# Patient Record
Sex: FEMALE | Employment: UNEMPLOYED | URBAN - METROPOLITAN AREA
[De-identification: names, ages, dates, MRNs, and addresses within clinical notes are randomized per-mention and may not be internally consistent; named-entity substitution may affect disease eponyms.]

---

## 2019-05-21 ENCOUNTER — HOSPITAL ENCOUNTER (INPATIENT)
Age: 19
LOS: 3 days | Discharge: HOME | DRG: 885 | End: 2019-05-24
Attending: PSYCHIATRY & NEUROLOGY | Admitting: PSYCHIATRY & NEUROLOGY
Payer: SELF-PAY

## 2019-05-21 PROBLEM — S61.519A SELF-INFLICTED LACERATION OF WRIST, INITIAL ENCOUNTER (HCC): Status: ACTIVE | Noted: 2019-05-21
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
## 2019-05-21 PROBLEM — Z87.09 HISTORY OF ASTHMA: Chronic | Status: ACTIVE | Noted: 2019-05-21

## 2019-05-21 PROBLEM — F32.9 MAJOR DEPRESSION: Status: ACTIVE | Noted: 2019-05-21

## 2019-05-21 PROBLEM — Z86.59 HISTORY OF ADHD: Chronic | Status: ACTIVE | Noted: 2019-05-21

## 2019-05-21 PROCEDURE — 65220000003 HC RM SEMIPRIVATE PSYCH

## 2019-05-21 PROCEDURE — 74011250637 HC RX REV CODE- 250/637: Performed by: PSYCHIATRY & NEUROLOGY

## 2019-05-21 RX ORDER — ADHESIVE BANDAGE
30 BANDAGE TOPICAL DAILY PRN
Status: DISCONTINUED | OUTPATIENT
Start: 2019-05-21 | End: 2019-05-24 | Stop reason: HOSPADM

## 2019-05-21 RX ORDER — IBUPROFEN 200 MG
1 TABLET ORAL
Status: DISCONTINUED | OUTPATIENT
Start: 2019-05-21 | End: 2019-05-24 | Stop reason: HOSPADM

## 2019-05-21 RX ORDER — DULOXETIN HYDROCHLORIDE 60 MG/1
60 CAPSULE, DELAYED RELEASE ORAL DAILY
Status: DISCONTINUED | OUTPATIENT
Start: 2019-05-21 | End: 2019-05-22

## 2019-05-21 RX ORDER — OLANZAPINE 5 MG/1
5 TABLET ORAL
Status: DISCONTINUED | OUTPATIENT
Start: 2019-05-21 | End: 2019-05-24 | Stop reason: HOSPADM

## 2019-05-21 RX ORDER — TRAZODONE HYDROCHLORIDE 50 MG/1
50 TABLET ORAL
Status: DISCONTINUED | OUTPATIENT
Start: 2019-05-21 | End: 2019-05-24 | Stop reason: HOSPADM

## 2019-05-21 RX ORDER — BENZTROPINE MESYLATE 2 MG/1
2 TABLET ORAL
Status: DISCONTINUED | OUTPATIENT
Start: 2019-05-21 | End: 2019-05-24 | Stop reason: HOSPADM

## 2019-05-21 RX ORDER — METHYLPHENIDATE HYDROCHLORIDE 36 MG/1
36 TABLET ORAL
COMMUNITY

## 2019-05-21 RX ORDER — ACETAMINOPHEN 325 MG/1
650 TABLET ORAL
Status: DISCONTINUED | OUTPATIENT
Start: 2019-05-21 | End: 2019-05-24 | Stop reason: HOSPADM

## 2019-05-21 RX ORDER — DULOXETIN HYDROCHLORIDE 60 MG/1
60 CAPSULE, DELAYED RELEASE ORAL DAILY
Status: ON HOLD | COMMUNITY
End: 2019-05-23 | Stop reason: SDUPTHER

## 2019-05-21 RX ORDER — HYDROXYZINE 50 MG/1
50 TABLET, FILM COATED ORAL
Status: DISCONTINUED | OUTPATIENT
Start: 2019-05-21 | End: 2019-05-24 | Stop reason: HOSPADM

## 2019-05-21 RX ORDER — ALBUTEROL SULFATE 0.83 MG/ML
2.5 SOLUTION RESPIRATORY (INHALATION)
Status: DISCONTINUED | OUTPATIENT
Start: 2019-05-21 | End: 2019-05-24 | Stop reason: HOSPADM

## 2019-05-21 RX ORDER — BENZTROPINE MESYLATE 1 MG/ML
2 INJECTION INTRAMUSCULAR; INTRAVENOUS
Status: DISCONTINUED | OUTPATIENT
Start: 2019-05-21 | End: 2019-05-24 | Stop reason: HOSPADM

## 2019-05-21 RX ORDER — IBUPROFEN 400 MG/1
400 TABLET ORAL
Status: DISCONTINUED | OUTPATIENT
Start: 2019-05-21 | End: 2019-05-24 | Stop reason: HOSPADM

## 2019-05-21 RX ADMIN — DULOXETINE HYDROCHLORIDE 60 MG: 60 CAPSULE, DELAYED RELEASE ORAL at 17:45

## 2019-05-21 NOTE — BH NOTES
TRANSFER - IN REPORT:    Verbal report received  Sarmad Kwame (name) on Abdon Renteria  being received from [facility]) for routine progression of care      Report consisted of patients Situation, Background, Assessment and   Recommendations(SBAR). Information from the following report(s) SBAR was reviewed with the receiving nurse. Opportunity for questions and clarification was provided. Assessment completed upon patients arrival to unit and care assumed.      At [phone] patient arrived to unit    Primary Nurse Leon Enamorado RN and Casimiro Mitchell RN performed a dual skin assessment on this patient No impairment noted  Abdias score is 23
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
## 2019-05-22 PROCEDURE — 74011250637 HC RX REV CODE- 250/637: Performed by: PSYCHIATRY & NEUROLOGY

## 2019-05-22 PROCEDURE — 65220000003 HC RM SEMIPRIVATE PSYCH

## 2019-05-22 RX ORDER — LAMOTRIGINE 25 MG/1
25 TABLET ORAL DAILY
Status: DISCONTINUED | OUTPATIENT
Start: 2019-05-22 | End: 2019-05-24 | Stop reason: HOSPADM

## 2019-05-22 RX ADMIN — TRAZODONE HYDROCHLORIDE 50 MG: 50 TABLET ORAL at 21:14

## 2019-05-22 RX ADMIN — DULOXETINE HYDROCHLORIDE 60 MG: 60 CAPSULE, DELAYED RELEASE ORAL at 08:53

## 2019-05-22 RX ADMIN — LAMOTRIGINE 25 MG: 25 TABLET ORAL at 10:54

## 2019-05-22 NOTE — PROGRESS NOTES
Problem: Depressed Mood (Adult/Pediatric)  Goal: *STG: Remains safe in hospital  Outcome: Progressing Towards Goal  Note:   2320 Pt appears asleep in bed. Respirations even and unlabored. Will monitor with Q 15 safety checks.

## 2019-05-22 NOTE — PROGRESS NOTES
Admission Medication Reconciliation:    Information obtained from:  Patient interview / [facility]  / Insurance claims data    Comments/Recommendations: Updated PTA meds/reviewed patient's allergies. 1)  Patient reports being on duloxetine 60mg for ~2 years. She previously tried [facility]" but felt it made her feel worse. 2)  Medication changes (since last review): none    3)  The [place] Prescription Monitoring Program () was assessed to determine fill history of any controlled medications. The patient has filled methylphenidate regularly for the last 2 years. Most recently, the patient filled two methylphendiate products (ER 36mg on 3/26/19 and CD 60mg on 3/25/19) but she only recalls generic Concerta. Allergies:  Patient has no known allergies. Significant PMH/Disease States:   Past Medical History:   Diagnosis Date    ADHD     Depression     Family history of asthma     Self-inflicted laceration of wrist, initial encounter 5/21/2019    Distal forearm/wrist     Chief Complaint for this Admission:  No chief complaint on file. Prior to Admission Medications:   Prior to Admission Medications   Prescriptions Last Dose Informant Patient Reported? Taking? DULoxetine (CYMBALTA) 60 mg capsule 5/20/2019 at 0900  Yes Yes   Sig: Take 60 mg by mouth daily. methylphenidate ER 36 mg 24 hr tab 5/17/2019 at 0900  Yes Yes   Sig: Take 36 mg by mouth every morning.  Indications: Attention Deficit Disorder with Hyperactivity      Facility-Administered Medications: None     Kimberly Quispe, PharmD, BCPP, BCPS  Clinical Pharmacy Specialist, [facility]

## 2019-05-22 NOTE — BH NOTES
GROUP THERAPY PROGRESS NOTE    Jose Alberto Quinones is participating in Anger Management. Group time: 50 minutes    Personal goal for participation: To Gain Awareness    Goal orientation: personal    Group therapy participation: Patient did not attend even though encouraged by staff to do so. Therapeutic interventions reviewed and discussed: Anger Management Coping Styles quiz and handouts of principles regarding anger.     Impression of participation: N/A

## 2019-05-22 NOTE — INTERDISCIPLINARY ROUNDS
Behavioral Health Interdisciplinary Rounds Patient Name: Lora Crooks  Age: 20 y.o. Room/Bed:  727/ Primary Diagnosis: <principal problem not specified> Admission Status: Voluntary Readmission within 30 days: no 
Power of  in place: no 
Patient requires a blocked bed: no          Reason for blocked bed: n/a 
 
VTE Prophylaxis: Not indicated Mobility needs/Fall risk: no 
Nutritional Plan: no 
Consults:         
Labs/Testing due today?: no 
 
Sleep hours:       
Participation in Care/Groups:  yes Medication Compliant?: Yes PRNS (last 24 hours): None Restraints (last 24 hours):  no 
  
CIWA (range last 24 hours): COWS (range last 24 hours): Alcohol screening (AUDIT) completed -   AUDIT Score: 0 If applicable, date SBIRT discussed in treatment team AND documented:  
AUDIT Screen Score: AUDIT Score: 0 Tobacco - patient is a smoker: Have You Used Tobacco in the Past 30 Days: No 
Illegal Drugs use: Have You Used Any Illegal Substances Over the Past 12 Months: No 
 
24 hour chart check complete: yes Patient goal(s) for today: meet with treatment team 
Treatment team focus/goals: psychosocial and care plan; start Lamictal 
Progress note: Pt reports long history of depression and SI with self-injurious behaviors LOS:  1  Expected LOS: TBD Financial concerns/prescription coverage: Uninsured Date of last family contact: None Family requesting physician contact today: No 
Discharge plan: Return home Guns in the home: no Outpatient provider(s): To be linked Participating treatment team members: Lora CrooksYoon MSW; Dr. Rivera Jiang MD; Anastasia Garcia RN; Arvind Cordero, PharmD

## 2019-05-22 NOTE — PROGRESS NOTES
Problem: Discharge Planning  Goal: *Discharge to safe environment  Outcome: Progressing Towards Goal  Note:   Patient identifies home as a safe environment. Patient will return home upon discharge. Goal: *Knowledge of medication management  Outcome: Progressing Towards Goal  Note:   Patient verbalizes understanding of medication regimen. Patient agrees to take all medications as prescribed. Goal: *Knowledge of discharge instructions  Outcome: Progressing Towards Goal  Note:   Patient verbalizes understanding of goals for treatment and safe discharge.

## 2019-05-22 NOTE — CONSULTS
Hospitalist H&P Note          Dana Selby NP  Call physician on-call through the  7pm-7am    Primary Care Provider: UNKNOWN  Source of Information: Patient, ED records    History of Presenting Illness:   Patient is 25yo female w/ PMH depression, asthma, ADHD and cutting herself in middle school who presented to ED after lacerating her left inner forearm. She has been depressed and under a lot of stress at home, where she lives with her boyfriend. 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 Hospitalist is consulted for H&P and medical clearance. Today she is seen alone in the dining room and ambulates without difficulty. She is A&Ox4 and in NAD. ED records show labs drawn and are unremarkable. Pt states she is adopted but is aware that her birth mother has lupus. She is feeling sad and anxious today but denies SI/HI. She denies any significant PMH except asthma and does not use an inhaler. She states she hasn't needed one \"for years. \" Patient is a voluntary IP psych admit for major depression. Left forearm self inflicted laceration c/d/i with stitches intact.        Review of Systems:  General: negative for fever, chills, sweats, weakness, weight loss  Eyes: negative for changes or loss in vision, eye pain  Ear Nose and Throat: negative for rhinorrhea, otalgia, speech or swallowing difficulties  Respiratory:  negative for cough, sputum production, SOB, wheezing, ORTIZ  Cardiology:  negative for chest pain, palpitations, orthopnea, edema, syncope   Gastrointestinal: negative for abdominal pain, N/V, change in bowel habits, bleeding  Genitourinary: negative for frequency, urgency, dysuria, hematuria, incontinence  Musculoskeletal : negative for arthralgia, myalgia  Skin: negative for easy bruising, bleeding, negative for rash, +L forearm lac, new lesion  Endocrine: negative for hot flashes or polydipsia  Neurological: negative for headache, dizziness, confusion or memory loss, focal weakness, paresthesia, gait disturbance  Psychological: negative for +anxiety/depression, agitation      Past Medical History:   Diagnosis Date    ADHD     Depression     Family history of asthma     Self-inflicted laceration of wrist, initial encounter 5/21/2019    Distal forearm/wrist      Past Surgical History:   Procedure Laterality Date    HX CYST REMOVAL      from neck as very young child     Prior to Admission medications    Medication Sig Start Date End Date Taking? Authorizing Provider   methylphenidate ER 36 mg 24 hr tab Take 36 mg by mouth every morning. Indications: Attention Deficit Disorder with Hyperactivity   Yes Provider, Historical   DULoxetine (CYMBALTA) 60 mg capsule Take 60 mg by mouth daily. Yes Provider, Historical     No Known Allergies   Family History   Adopted: Yes   Problem Relation Age of Onset    Lupus Other         SOCIAL HISTORY:  Patient resides:  Independently X   Assisted Living    SNF    With family care       Smoking history:   None X   Former    Chronic      Alcohol history:   None    Social X   Chronic      Ambulates:   Independently X   w/cane    w/walker    w/wc      CODE STATUS:  DNR    Full X   Other      Objective:   Physical Exam:   Visit Vitals  /71 (BP Patient Position: Sitting)   Pulse (!) 105   Temp 97.8 °F (36.6 °C)   Resp 18   SpO2 97%   Breastfeeding? No           General:  Alert, cooperative, no distress, appears stated age. HEENT:  Normocephalic, atraumatic. Conjunctivae/corneas clear. PERRL, EOMs intact. Nares nl. Septum midline. Mucosa nl. No drainage or sinus tenderness. Lips, mucosa, and tongue nl. Teeth and gums nl. Neck: Supple, symmetrical, trachea midline, no adenopathy, thyroid: no enlargement/tenderness/nodules    Back:   Symmetric, no curvature. ROM nl. No CVA tenderness. Lungs:   Clear to auscultation bilaterally. No Wheezing/Rhonchi/Rales.  No SOB, no accessory muscle use    Heart:  Regular rate and rhythm, S1, S2 nl, no murmur, click, rub or gallop. Abdomen:   Soft, non-tender. Bowel sounds nl. Extremities: Extremities nl, atraumatic, no clubbing, cyanosis or edema. Pulses 2+ and symmetric    Skin: Skin color, texture, turgor nl. +L wrist/distal forearm lac. Cap refill <3 sec    Psych: Cooperative, not anxious or agitated. A/O x 3. Neurologic: CNII-XII grossly intact. no focal neurological deficits,  moving all extremities, speech clear      EKG:  NA    Data Review:   Recent Days:  Lab work from ED is unremarkable. Imaging: None    Assessment & Plan     Active Problems:    Major depression (5/21/2019)  -Mgt per psych    Self-inflicted laceration of wrist, initial encounter (5/21/2019)      Overview: Distal forearm/wrist  -Wound c/d/i; stitches intact. -Recommend keep clean and dry    Hx of Asthma  -PRN ventolin inhaler      Thank you for giving us opportunity to participate in this patients care.    Will sign off at this time, please re-consult if there are any further medical management needs or questions    Gloivory Payor  Adult Hospitalists       Signed By: Natasha Caballero NP     May 21, 2019
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
## 2019-05-22 NOTE — PROGRESS NOTES
[address] [facility]  Master Treatment Plan for Abdon Renteria    Date Treatment Plan Initiated: 5/22/19    Treatment Plan Modalities:  Type of Modality Amount  (x minutes) Frequency (x/week) Duration (x days) Name of Responsible Staff   Community & wrap-up meetings to encourage peer interactions 13 7 1 Dami Patino psychotherapy to assist in building coping skills and internal controls 60 7 1 Hosea Soliz   Therapeutic activity groups to build coping skills 60 7 1 Hosea Soliz   Psychoeducation in group setting to address:   Medication education   15 7 [address], RN   Coping skills         Relaxation techniques         Symptom management         Discharge planning   60 2 Valencia Starks 115   60 1 1 [facility]/AA/NA         Physician medication management   15 7 1 Dr. Ronald Clark meeting/discharge planning   15 2 Brandyn Melendrez 78                                  Goal will be met by 5/26/19:    Problem: Falls - Risk of  Goal: *Absence of Falls  Description  Document Panda Lidiaannelise Fall Risk and appropriate interventions in the flowsheet. Outcome: Progressing Towards Goal  Note:   Fall Risk Interventions:            Medication Interventions: Teach patient to arise slowly                   Problem: Patient Education: Go to Patient Education Activity  Goal: Patient/Family Education  Outcome: Progressing Towards Goal     Problem: Depressed Mood (Adult/Pediatric)  Goal: *STG: Participates in treatment plan  Outcome: Progressing Towards Goal  Note:   Pt participated in treatment team. Visible on the unit for small periods of time. Stated she has been depressed for 2 years. Goal: *STG: Attends activities and groups  Outcome: Progressing Towards Goal  Note:   Encouraged to attend groups and express feelings and concerns.   Goal: *STG: Remains safe in hospital  Outcome: Progressing Towards Goal  Note:   Remains safe on the unit and continued on Q 15 minute safety checks. Goal: *STG: Complies with medication therapy  Outcome: Progressing Towards Goal  Note:   Taking medications as scheduled.   Goal: Interventions  Outcome: Progressing Towards Goal     Problem: Patient Education: Go to Patient Education Activity  Goal: Patient/Family Education  Outcome: Progressing Towards Goal

## 2019-05-23 PROCEDURE — 65220000003 HC RM SEMIPRIVATE PSYCH

## 2019-05-23 PROCEDURE — 74011250637 HC RX REV CODE- 250/637: Performed by: PSYCHIATRY & NEUROLOGY

## 2019-05-23 RX ORDER — LAMOTRIGINE 25 MG/1
TABLET ORAL
Qty: 73 TAB | Refills: 0 | Status: SHIPPED | OUTPATIENT
Start: 2019-05-23

## 2019-05-23 RX ORDER — DULOXETIN HYDROCHLORIDE 60 MG/1
60 CAPSULE, DELAYED RELEASE ORAL DAILY
Qty: 60 CAP | Refills: 0 | Status: SHIPPED | OUTPATIENT
Start: 2019-05-23

## 2019-05-23 RX ADMIN — LAMOTRIGINE 25 MG: 25 TABLET ORAL at 08:53

## 2019-05-23 RX ADMIN — DULOXETINE HYDROCHLORIDE 90 MG: 60 CAPSULE, DELAYED RELEASE ORAL at 08:53

## 2019-05-23 NOTE — PROGRESS NOTES
Problem: Depressed Mood (Adult/Pediatric)  Goal: *STG: Participates in treatment plan  Outcome: Progressing Towards Goal  Mood is subdued. Pt is hopeful to be discharged tomorrow.

## 2019-05-23 NOTE — PROGRESS NOTES
GROUP THERAPY PROGRESS NOTE      Alma Buitrago was present for medication education group. GROUP TIME: 55 minutes, Thursday 14:00-14:55    PERSONAL GOAL FOR PARTICIPATION: To be present for group, participate in discussion and answer patient-directed questions. THERAPEUTIC INTERVENTIONS REVIEWED AND DISCUSSED: The following topic was presented: Depression. Signs and symptoms of depression were discussed (in regards to target symptoms for medications). The mechanism of action was discussed for the most common antidepressant classes. Expectations of medications were discussed including when to expect response, potential side effects and ways to prevent/remedy them. Patients were encouraged to advocate for themselves, create healthy therapeutic relationships with providers (particularly once outpatient) and to ask questions when needed    IMPRESSION OF PARTICIPATION: Ms. Cory Cogan was present throughout the medication education group. She was an active participant and shared personal experiences regarding medication use with the group. She asked great questions regarding the mechanisms of antidepressants and stated that she had watched a video online about how the medications work in your body. She was interested in learning more about bupropion and asked if that was an option for her. Pharmacist talked to Ms. Cory Cogan privately following the group and discussed that her medication would likely not be changed as this would interfere with her plan to discharge in the AM. But she was encouraged to discuss bupropion with her outpatient provider.      Rosa Nolan, PharmD, BCPP, [facility] Specialist, [facility]

## 2019-05-23 NOTE — PROGRESS NOTES
Problem: Discharge Planning  Goal: *Discharge to safe environment  Outcome: Progressing Towards Goal  Note:   Patient identifies home as a safe environment. Patient will return home upon discharge. Goal: *Knowledge of medication management  Outcome: Progressing Towards Goal  Note:   Patient verbalizes understanding of medication regimen. Patient is taking all medications as prescribed. Goal: *Knowledge of discharge instructions  Outcome: Progressing Towards Goal  Note:   Patient verbalizes understanding of goals for treatment and safe discharge.

## 2019-05-23 NOTE — H&P
[facility] HISTORY AND PHYSICAL    Name:  Constantino Jain  MR#:  [id number]  :  2000  ACCOUNT #:  [de-identified]  ADMIT DATE:  2019    INITIAL PSYCHIATRIC INTERVIEW    CHIEF COMPLAINT:  \"I was very depressed. \"    HISTORY OF PRESENT ILLNESS:  The patient is a 74-year-old -American female who is currently transferred to us from the [facility] in [facility]. She had presented there with a history of having cut her left forearm quite deeply requiring multiple sutures. She reports that she has been increasingly depressed over the past two months and has had several stressors. This includes financial difficulties and says she has been unable to pay many of her bills recently. There has also been conflict with her boyfriend, although it appears to be centered around their financial issues. States that she has been more depressed, has had little energy or motivation and feels depressed almost everyday. Denies any symptoms suggestive of owen or hypomania. She denies use of recreational substances or heavy use of alcohol. Denies any psychotic symptoms. She has been prescribed Concerta, but denies that she is abusing it. She had been recently prescribed Cymbalta and says she has been compliant with it but feels it does not help her at all. Denies any psychotic symptoms. PAST MEDICAL HISTORY:  Reviewed as per the history and physical exam.    Past Medical History:   Diagnosis Date    ADHD     Depression     Family history of asthma     Self-inflicted laceration of wrist, initial encounter 2019    Distal forearm/wrist      Prior to Admission medications    Medication Sig Start Date End Date Taking? Authorizing Provider   methylphenidate ER 36 mg 24 hr tab Take 36 mg by mouth every morning. Indications: Attention Deficit Disorder with Hyperactivity   Yes Provider, Historical   DULoxetine (CYMBALTA) 60 mg capsule Take 60 mg by mouth daily.    Yes Provider, Historical      Vitals:    05/22/19 0817 05/22/19 1139 05/22/19 1538 05/23/19 0807   BP: 102/71 96/68 100/69 93/64   Pulse: 96 99 87 63   Resp: 18 18 16 16   Temp: 98.4 °F (36.9 °C) 98.7 °F (37.1 °C) 98.5 °F (36.9 °C) 98.1 °F (36.7 °C)   SpO2: 98% 99% 97% 97%   Weight:  70.1 kg (154 lb 8 oz)     Height:  5' (1.524 m)      No results found for: WBC, WBCLT, HGBPOC, HGB, HGBP, HCTPOC, HCT, PHCT, RBCH, PLT, MCV, HGBEXT, HCTEXT, PLTEXT No results found for: NA, K, CL, CO2, AGAP, GLU, BUN, CREA, BUCR, GFRAA, GFRNA, CA, TBIL, TBILI, GPT, SGOT, AP, TP, ALB, GLOB, AGRAT, ALT No results found for: [address] , [id number], [id number], [id number], PREGU, POCHCG, MHCGN, HCGQR, THCGA1, SHCG, HCGN, HCGSERUM, HCGURQLPOC    PAST PSYCHIATRIC HISTORY:  The patient reports that she has been cutting her forearm or legs since middle school and usually does it several times in a month, particularly when she is more stressed. She was started on Cymbalta about 2 years ago when she was in treatment at the local [facility] but has not followed up there again. Most recently, she had been hospitalized in a partial hospitalization program at [facility] in [facility] and feels it was somewhat helpful. Denies any prior serious suicide attempts. She reports that she cuts because it helps her feel emotionally better. Denies prior history of substance abuse. PSYCHOSOCIAL HISTORY:  The patient currently lives in [place], [place], where she lives with her boyfriend of 3 months. They rent an apartment together. She has never been  and does not have any children. Currently, she states that she works in a [employer] but the pay is meager. Reports that she finished high school and did one year of college. She appears to have limited social support, otherwise. Denies any major legal stressors. MENTAL STATUS EXAM:  The patient is a young -American female who is dressed in hospital apparel. She looks sad and makes limited eye contact. Her affect is depressed and mood is reported as being low. Passive suicidal ideation is present, but denies an active plan. Denies any perceptual abnormalities. Denies any delusions. Her thought process is logical and goal-directed. Cognitively, she is awake and alert, and oriented to time, place, and person. Intelligence is average. Memory is intact and fund of knowledge is adequate. Insight is partial.  Judgment is poor. ASSESSMENT AND PLAN/DIAGNOSIS:  Recurrent major depression, severe without psychotic symptoms, borderline personality disorder. I will continue her inpatient stay. She will be provided with support and attend groups. Her psychotropic medications will be adjusted as needed. Estimated length of stay is 5-7 days. Her strength include her ability to seek help and support from her boyfriend.         AUDIE Carvajal MD      [id number]  D:  05/22/2019 15:12  T:  05/22/2019 15:19  JOB #:  [id number]

## 2019-05-23 NOTE — PROGRESS NOTES
Problem: Falls - Risk of  Goal: *Absence of Falls  Description  Document Valeriano Posadas Fall Risk and appropriate interventions in the flowsheet. Outcome: Progressing Towards Goal  Note:   5179 Pt appears asleep in bed. Respirations even and unlabored. Will monitor with Q 15 safety checks.

## 2019-05-23 NOTE — PROGRESS NOTES
Problem: Depressed Mood (Adult/Pediatric)  Goal: *STG: Participates in treatment plan  Outcome: Progressing Towards Goal  Mood is subdued. She verbalizes her needs appropriately.   Trazadone 50 mg given at HS, per pt's request to promote rest.

## 2019-05-23 NOTE — PROGRESS NOTES
Problem: Depressed Mood (Adult/Pediatric)  Goal: *STG: Participates in treatment plan  5/22/2019 2255 by Isatu Mcgee RN  Outcome: Progressing Towards Goal  Pt participates in therapeutic activities.   2115  Trazadone 50 mg given per pt request, to promote rest.

## 2019-05-23 NOTE — INTERDISCIPLINARY ROUNDS
Behavioral Health Interdisciplinary Rounds Patient Name: Angel Oropeza  Age: 20 y.o. Room/Bed:  727/ Primary Diagnosis: <principal problem not specified> Admission Status: Voluntary Readmission within 30 days: no 
Power of  in place: no 
Patient requires a blocked bed: no          Reason for blocked bed: n/a 
 
VTE Prophylaxis: Not indicated Mobility needs/Fall risk: no 
Nutritional Plan: no 
Consults:         
Labs/Testing due today?: no 
 
Sleep hours: 8 Participation in Care/Groups:  yes Medication Compliant?: Yes PRNS (last 24 hours): Sleep Aid Restraints (last 24 hours):  no 
  
CIWA (range last 24 hours): COWS (range last 24 hours): Alcohol screening (AUDIT) completed -   AUDIT Score: 0 If applicable, date SBIRT discussed in treatment team AND documented:  
AUDIT Screen Score: AUDIT Score: 0 
 
24 hour chart check complete: yes Patient goal(s) for today: Attend all groups Treatment team focus/goals: Schedule follow-up Progress note: Pt resistant to attend groups, but eventually agreed; states boyfriend visited last night; still presents with depressed affect; denies SI 
 
LOS:  2  Expected LOS: 3 Financial concerns/prescription coverage: Uninsured Date of last family contact: 5/22 boyfriend visited Family requesting physician contact today: No 
Discharge plan: Return home Guns in the home: no Outpatient provider(s): To be linked to [facility] Participating treatment team members: Angel Oropeza, SCOTT Townsend; Dr. Shirley Perez MD; Audrey Hodge RN; Rashida Isaac, EleazarD

## 2019-05-23 NOTE — BH NOTES
Chief Complaint:      Interval History:  Singh Shin is minimally improved. She did not attend any groups yesterday but did attend one today. She has remained isolative to her room. Says her mood is slightly better and she slept well last night. Denies any adverse events. Her boy friend had reported that she has been drinking more but Ron Joy denies this. Notes that she drinks about 4 beers once a week. Frequency of SI has decreased. Past Medical History:  Past Medical History:   Diagnosis Date    ADHD     Depression     Family history of asthma     Self-inflicted laceration of wrist, initial encounter 5/21/2019    Distal forearm/wrist           Labs:  No results found for: WBC, WBCLT, HGBPOC, HGB, HGBP, HCTPOC, HCT, PHCT, RBCH, PLT, MCV, HGBEXT, HCTEXT, PLTEXT No results found for: NA, K, CL, CO2, AGAP, GLU, BUN, CREA, BUCR, GFRAA, GFRNA, CA, TBIL, TBILI, GPT, SGOT, AP, TP, ALB, GLOB, AGRAT, ALT   Vitals:    05/22/19 0817 05/22/19 1139 05/22/19 1538 05/23/19 0807   BP: 102/71 96/68 100/69 93/64   Pulse: 96 99 87 63   Resp: 18 18 16 16   Temp: 98.4 °F (36.9 °C) 98.7 °F (37.1 °C) 98.5 °F (36.9 °C) 98.1 °F (36.7 °C)   SpO2: 98% 99% 97% 97%   Weight:  70.1 kg (154 lb 8 oz)     Height:  5' (1.524 m)             Mental Status Exam:  Eye contact: improved  Psychomotor activity: WNL  Speech is spontaneous  Mood is \"okay\"  Affect: depressed  Perception: denies any AH or VH  Suicidal ideation: No SI or plan. Cognition is grossly intact      Physical Exam:  Body habitus: obese  Musculoskeletal system: normal gait  Tremor is not present  Cog wheeling is not present. Assessment and Plan:  Artur Edwards meets criteria for a diagnosis of Recurrent Major depression  Continue the medication regimen as prescribed  Disposition planning to continue.    I certify that this patients inpatient psychiatric hospital services furnished since the previous certification were, and continue to be, required for treatment that could reasonably be expected to improve the patient's condition, or for diagnostic study, and that the patient continues to need, on a daily basis, active treatment furnished directly by or requiring the supervision of inpatient psychiatric facility personnel. In addition, the hospital records show that services furnished were intensive treatment services, admission or related services, or equivalent services.

## 2019-05-23 NOTE — BH NOTES
GROUP THERAPY PROGRESS NOTE    Linda Mckinley is participating in D/C Planning Group    Group time: 1.5 hour    Personal goal for participation: Readiness for D/C / Personal    Goal orientation: Dev- Effective Support Team    Group therapy participation: minimal    Therapeutic interventions reviewed and discussed: Yes    Impression of participation: pt told Team that her family provides her source of support but she acknowledged her needs are greater than her family can provide. Pt  Stated she needs to evaluate goals for future and than determine what helps she needs.  US advised to discuss her needs and concerns with her tx team

## 2019-05-24 VITALS
OXYGEN SATURATION: 98 % | HEIGHT: 60 IN | DIASTOLIC BLOOD PRESSURE: 57 MMHG | BODY MASS INDEX: 30.33 KG/M2 | WEIGHT: 154.5 LBS | RESPIRATION RATE: 16 BRPM | HEART RATE: 107 BPM | SYSTOLIC BLOOD PRESSURE: 95 MMHG | TEMPERATURE: 98.3 F

## 2019-05-24 PROCEDURE — 74011250637 HC RX REV CODE- 250/637: Performed by: PSYCHIATRY & NEUROLOGY

## 2019-05-24 RX ADMIN — DULOXETINE HYDROCHLORIDE 90 MG: 60 CAPSULE, DELAYED RELEASE ORAL at 08:48

## 2019-05-24 RX ADMIN — LAMOTRIGINE 25 MG: 25 TABLET ORAL at 08:48

## 2019-05-24 NOTE — DISCHARGE INSTRUCTIONS
DISCHARGE SUMMARY    NAME:Brandy Collette Jordan  : 2000  MRN: [id number]    The patient Artur Edwards exhibits the ability to control behavior in a less restrictive environment. Patient's level of functioning is improving. No assaultive/destructive behavior has been observed for the past 24 hours. No suicidal/homicidal threat or behavior has been observed for the past 24 hours. There is no evidence of serious medication side effects. Patient has not been in physical or protective restraints for at least the past 24 hours. If weapons involved, how are they secured? No weapons involved. Is patient aware of and in agreement with discharge plan? Yes    Arrangements for medication:  Prescriptions given to patient. Copy of discharge instructions to  provider?:  [facility] ([phone])    Arrangements for transportation home:  Boyfriend to . Keep all follow up appointments as scheduled, continue to take prescribed medications per physician instructions. Mental health crisis number:  201 or your local mental health crisis line number at [phone]. DISCHARGE SUMMARY from Nurse    PATIENT INSTRUCTIONS:    What to do at Home:  Recommended activity: Activity as tolerated. If you experience any of the following symptoms:  Overwhelming anxiety or depression, thoughts of hurting yourself or others, please follow up with 911 or your local mental health crisis line number at [phone]. *  Please give a list of your current medications to your Primary Care Provider. *  Please update this list whenever your medications are discontinued, doses are      changed, or new medications (including over-the-counter products) are added. *  Please carry medication information at all times in case of emergency situations.     These are general instructions for a healthy lifestyle:    No smoking/ No tobacco products/ Avoid exposure to second hand smoke  Surgeon General's Warning: Quitting smoking now greatly reduces serious risk to your health. Obesity, smoking, and sedentary lifestyle greatly increases your risk for illness    A healthy diet, regular physical exercise & weight monitoring are important for maintaining a healthy lifestyle    You may be retaining fluid if you have a history of heart failure or if you experience any of the following symptoms:  Weight gain of 3 pounds or more overnight or 5 pounds in a week, increased swelling in our hands or feet or shortness of breath while lying flat in bed. Please call your doctor as soon as you notice any of these symptoms; do not wait until your next office visit. Recognize signs and symptoms of STROKE:    F-face looks uneven    A-arms unable to move or move unevenly    S-speech slurred or non-existent    T-time-call 911 as soon as signs and symptoms begin-DO NOT go       Back to bed or wait to see if you get better-TIME IS BRAIN. Warning Signs of HEART ATTACK     Call 911 if you have these symptoms:   Chest discomfort. Most heart attacks involve discomfort in the center of the chest that lasts more than a few minutes, or that goes away and comes back. It can feel like uncomfortable pressure, squeezing, fullness, or pain.  Discomfort in other areas of the upper body. Symptoms can include pain or discomfort in one or both arms, the back, neck, jaw, or stomach.  Shortness of breath with or without chest discomfort.  Other signs may include breaking out in a cold sweat, nausea, or lightheadedness. Don't wait more than five minutes to call 911 - MINUTES MATTER! Fast action can save your life. Calling 911 is almost always the fastest way to get lifesaving treatment. Emergency Medical Services staff can begin treatment when they arrive -- up to an hour sooner than if someone gets to the hospital by car. The discharge information has been reviewed with the patient. The patient verbalized understanding.   Discharge medications reviewed with the patient and appropriate educational materials and side effects teaching were provided.   ___________________________________________________________________________________________________________________________________

## 2019-05-24 NOTE — INTERDISCIPLINARY ROUNDS
Behavioral Health Interdisciplinary Rounds Patient Name: Rolando Flores  Age: 20 y.o. Room/Bed:  725/ Primary Diagnosis: <principal problem not specified> Admission Status: Voluntary Readmission within 30 days: no 
Power of  in place: no 
Patient requires a blocked bed: no          Reason for blocked bed: VTE Prophylaxis: No 
 
Mobility needs/Fall risk: no 
Flu Vaccine : no  
Nutritional Plan: no 
Consults:         
Labs/Testing due today?: no 
 
Sleep hours: 6.5 Participation in Care/Groups:  yes Medication Compliant?: Yes PRNS (last 24 hours): None Restraints (last 24 hours):  no 
  
CIWA (range last 24 hours): COWS (range last 24 hours): Alcohol screening (AUDIT) completed -   AUDIT Score: 0 If applicable, date SBIRT discussed in treatment team AND documented:  
AUDIT Screen Score: AUDIT Score: 0 Tobacco - patient is a smoker: Have You Used Tobacco in the Past 30 Days: No 
Illegal Drugs use: Have You Used Any Illegal Substances Over the Past 12 Months: No 
 
24 hour chart check complete: yes Patient goal(s) for today: Discharge Treatment team focus/goals: Discharge Progress note: Patient is stable and ready for discharge LOS:  3  Expected LOS: 3 Financial concerns/prescription coverage: Uninsured Date of last family contact: None Family requesting physician contact today: No  
Discharge plan: Return home Guns in the home: No     
[facility] Participating treatment team members: Rolando Mark, SCOTT Haque; Dr. Ling Willson MD; Severo Orchard, RN; Graeme Salazar, PharmD

## 2019-05-24 NOTE — PROGRESS NOTES
Pharmacist Discharge Medication Reconciliation    Discharging Provider: Dr. Brielle Healy [facility]:   Past Medical History:   Diagnosis Date    ADHD     Depression     Family history of asthma     Self-inflicted laceration of wrist, initial encounter 5/21/2019    Distal forearm/wrist     Chief Complaint for this Admission: No chief complaint on file. Allergies: Patient has no known allergies.     Discharge Medications:  START taking these medications:  - lamotrigine 25mg tablet - take 1 tablet daily x11 days, then 1 tablet BID x7 days, then 2 tablets BID x12 days, qty 73, refill 0    CONTINUE these medications which have CHANGED:  - duloxetine 30mg capsules - take 3 capsules by mouth daily (total of 90mg/day), qty 90, refill 0  (OF NOTE: the provider made updates on the paper script)    CONTINUE these medication which have NOT CHANGED:  - methylphenidate ER 36mg tablet - take 1 tablet by mouth every morning       The patient's chart, MAR and AVS were reviewed by Joe Hurley PHARMD.

## 2019-05-24 NOTE — PROGRESS NOTES
Problem: Depressed Mood (Adult/Pediatric)  Goal: *STG: Participates in treatment plan  Note:   Patient participates in treatment plan.

## 2019-05-24 NOTE — PROGRESS NOTES
Problem: Falls - Risk of  Goal: *Absence of Falls  Description  Document Ridgeway Hussein Fall Risk and appropriate interventions in the flowsheet. Outcome: Progressing Towards Goal  Note:   Fall Risk Interventions:  Medication Interventions: Teach patient to arise slowly  Received pt lying in bed, appears to be asleep. NAD. Respirations even and unlabored. Will continue to monitor Q15 for safety.

## 2019-05-24 NOTE — DISCHARGE SUMMARY
Some parts of the discharge summary are from the initial Psychiatric interview that was done on admission by the admitting psychiatrist.     Date of Admission: 5/21/2019    Date of Discharge: 5/24/2019     TYPE OF DISCHARGE:   REGULAR -  YES  AMA  RELEASED BY THE [facility] COURT    CHIEF COMPLAINT:  \"I was very depressed. \"     HISTORY OF PRESENT ILLNESS:  The patient is a 67-year-old -American female who is currently transferred to us from the [facility] in [facility]. She had presented there with a history of having cut her left forearm quite deeply requiring multiple sutures. She reports that she has been increasingly depressed over the past two months and has had several stressors. This includes financial difficulties and says she has been unable to pay many of her bills recently. There has also been conflict with her boyfriend, although it appears to be centered around their financial issues. States that she has been more depressed, has had little energy or motivation and feels depressed almost everyday. Denies any symptoms suggestive of owen or hypomania. She denies use of recreational substances or heavy use of alcohol. Denies any psychotic symptoms. She has been prescribed Concerta, but denies that she is abusing it. She had been recently prescribed Cymbalta and says she has been compliant with it but feels it does not help her at all. Denies any psychotic symptoms.     PAST MEDICAL HISTORY:  Reviewed as per the history and physical exam.          Past Medical History:   Diagnosis Date    ADHD      Depression      Family history of asthma      Self-inflicted laceration of wrist, initial encounter 5/21/2019     Distal forearm/wrist              Prior to Admission medications    Medication Sig Start Date End Date Taking? Authorizing Provider   methylphenidate ER 36 mg 24 hr tab Take 36 mg by mouth every morning.  Indications: Attention Deficit Disorder with Hyperactivity     Yes Provider, Historical   DULoxetine (CYMBALTA) 60 mg capsule Take 60 mg by mouth daily.     Yes Provider, Historical             Vitals:     05/22/19 0817 05/22/19 1139 05/22/19 1538 05/23/19 0807   BP: 102/71 96/68 100/69 93/64   Pulse: 96 99 87 63   Resp: 18 18 16 16   Temp: 98.4 °F (36.9 °C) 98.7 °F (37.1 °C) 98.5 °F (36.9 °C) 98.1 °F (36.7 °C)   SpO2: 98% 99% 97% 97%   Weight:   70.1 kg (154 lb 8 oz)       Height:   5' (1.524 m)        No results found for: WBC, WBCLT, HGBPOC, HGB, HGBP, HCTPOC, HCT, PHCT, RBCH, PLT, MCV, HGBEXT, HCTEXT, PLTEXT No results found for: NA, K, CL, CO2, AGAP, GLU, BUN, CREA, BUCR, GFRAA, GFRNA, CA, TBIL, TBILI, GPT, SGOT, AP, TP, ALB, GLOB, AGRAT, ALT No results found for: HCGUQC, [id number], [id number], [id number], PREGU, POCHCG, MHCGN, HCGQR, THCGA1, SHCG, HCGN, HCGSERUM, HCGURQLPOC     PAST PSYCHIATRIC HISTORY:  The patient reports that she has been cutting her forearm or legs since middle school and usually does it several times in a month, particularly when she is more stressed. She was started on Cymbalta about 2 years ago when she was in treatment at the local [facility] but has not followed up there again. Most recently, she had been hospitalized in a partial hospitalization program at [facility] in [facility] and feels it was somewhat helpful. Denies any prior serious suicide attempts. She reports that she cuts because it helps her feel emotionally better. Denies prior history of substance abuse.     PSYCHOSOCIAL HISTORY:  The patient currently lives in [place], [place], where she lives with her boyfriend of 3 months. They rent an apartment together. She has never been  and does not have any children. Currently, she states that she works in a [employer] but the pay is meager. Reports that she finished high school and did one year of college. She appears to have limited social support, otherwise.   Denies any major legal stressors.     MENTAL STATUS EXAM:  The patient is a young -American female who is dressed in hospital apparel. She looks sad and makes limited eye contact. Her affect is depressed and mood is reported as being low. Passive suicidal ideation is present, but denies an active plan. Denies any perceptual abnormalities. Denies any delusions. Her thought process is logical and goal-directed. Cognitively, she is awake and alert, and oriented to time, place, and person. Intelligence is average. Memory is intact and fund of knowledge is adequate. Insight is partial.  Judgment is poor.     ASSESSMENT AND PLAN/DIAGNOSIS:  Recurrent major depression, severe without psychotic symptoms, borderline personality disorder. COURSE IN THE HOSPITAL:  Patient was admitted to the inpatient psychiatry unit for acute psychiatric stabilization in regards to symptomatology as described in the HPI above and placed on Q15 minute checks and withdrawal precautions. While on the unit Ermelinda Pollard was involved in individual, group, occupational and milieu therapy. She was started back on her usual medication regimen as well as PRN medications including Cymbalta and was augmented with Lamotrigine. She improved gradually and was able to integrate into the milieu with help from the nursing staff. Patients symptoms improved gradually including depressed mood, SI and labile moods. She was quite on the unit, appropriate in her interactions, and cooperative with medications and the unit routine. Please see individual progress notes for more specific details regarding patient's hospitalization course. Patient was discharged as per the plan. She had been doing well on the unit as per the report of the nursing staff and my observations. No PRN medication for agitation, seclusion or restraints were required during the last 48 hours of her stay. Ermelinda Pollard had improved progressively to the point of being stable for discharge and outpatient FU.  At this time she did not offer any complaints. Patient denied any SI or HI. Denied any AH or VH. She denied any delusions. Was not considered a danger to self or to others and is safe for discharge. Will FU with her appointments and remains motivated to be in treatment. The patient verbalized understanding of her discharge instructions. DISCHARGE DIAGNOSIS:  Recurrent major depression. MENTAL STATUS EXAM ON DISCHARGE:    General appearance:   Lora Crooks is a 20 y.o. UNKNOWN female who is well groomed, psychomotor activity is WNL  Eye contact: makes good eye contact  Speech: Spontaneous and coherent  Affect : Euthymic  Mood: \"OK\"  Thought Process: Logical, goal directed  Perception: Denies any AH or VH. Thought Content: Denies any SI or Plan  Insight: Partial  Judgement: Fair  Cognition: Intact grossly. Current Discharge Medication List      START taking these medications    Details   lamoTRIgine (LAMICTAL) 25 mg tablet Take one tablet daily X 11 days, then one tablet BID X 7 days, then two tablets BID X 12 days. Indications: mood  Qty: 73 Tab, Refills: 0         CONTINUE these medications which have CHANGED    Details   DULoxetine (CYMBALTA) 60 mg capsule Take 1 Cap by mouth daily. Indications: depression  Qty: 60 Cap, Refills: 0         CONTINUE these medications which have NOT CHANGED    Details   methylphenidate ER 36 mg 24 hr tab Take 36 mg by mouth every morning. Indications: Attention Deficit Disorder with Hyperactivity              Follow-up Information     Follow up With Specialties Details Why Contact Janet Mackenzie on 5/28/2019 You have 4:15pm appointment with the psychiatrist for a hospital discharge follow-up. [facility]  [address], [address]  [phone]        WOUND CARE: none needed. PROGNOSIS:   Good / Fair based on nature of patient's pathology/ies and treatment compliance issues.   Prognosis is greatly dependent upon patient's ability to  follow up on psychiatric/psychotherapy appointments as well as to comply with psychiatric medications as prescribed.

## 2019-05-24 NOTE — BH NOTES
Behavioral Health Transition Record to Provider    Patient Name: Reymundo Hines  YOB: 2000  Medical Record Number: [id number]  Date of Admission: 5/21/2019  Date of Discharge: 5/24/2019    Attending Provider: Millicent Mccann MD  Discharging Provider: Millicent Mccann MD  To contact this individual call [phone] and ask the  to page. If unavailable, ask to be transferred to [facility] Provider on call. [facility] Provider will be available on call 24/7 and during holidays. Primary Care Provider: UNKNOWN    No Known Allergies    Reason for Admission: The patient is a 65-year-old -American female who is currently transferred to us from the [facility] in [facility]. She had presented there with a history of having cut her left forearm quite deeply requiring multiple sutures. She reports that she has been increasingly depressed over the past two months and has had several stressors. Admission Diagnosis: Major depression [F32.9]    * No surgery found *    No results found for this or any previous visit. Immunizations administered during this encounter: There is no immunization history on file for this patient. Screening for Metabolic Disorders for Patients on Antipsychotic Medications  (Data obtained from the EMR)    Estimated Body Mass Index  Estimated body mass index is 30.17 kg/m² as calculated from the following:    Height as of this encounter: 5' (1.524 m). Weight as of this encounter: 70.1 kg (154 lb 8 oz). Vital Signs/Blood Pressure  Visit Vitals  /67 (BP Patient Position: Sitting)   Pulse 97   Temp 98.4 °F (36.9 °C)   Resp 16   Ht 5' (1.524 m)   Wt 70.1 kg (154 lb 8 oz)   SpO2 99%   Breastfeeding?  No   BMI 30.17 kg/m²       Blood Glucose/Hemoglobin A1c  No results found for: GLU, GLUCPOC    No results found for: HBA1C, HGBE8, ZPY4TGLL     Lipid Panel  No results found for: CHOL, CHOLX, CHLST, CHOLV, 613313, HDL, LDL, LDLC, DLDLP, Elías Jensen, [address], [address], [address]  [facility]     Discharge Diagnosis: Major depression (ICD-10-CM: F32.9); Self-inflicted laceration of wrist, initial encounter (ICD-10-CM: M60.101T)    Discharge Plan: Patient discharged home into the care of her boyfriend. DISCHARGE SUMMARY    NAME:Dayan Garcia  : 2000  MRN: [id number]    The patient Micha Gale exhibits the ability to control behavior in a less restrictive environment. Patient's level of functioning is improving. No assaultive/destructive behavior has been observed for the past 24 hours. No suicidal/homicidal threat or behavior has been observed for the past 24 hours. There is no evidence of serious medication side effects. Patient has not been in physical or protective restraints for at least the past 24 hours. If weapons involved, how are they secured? No weapons involved. Is patient aware of and in agreement with discharge plan? Yes    Arrangements for medication:  Prescriptions given to patient. Copy of discharge instructions to  provider?:  [facility] ([phone])    Arrangements for transportation home:  Boyfriend to . Keep all follow up appointments as scheduled, continue to take prescribed medications per physician instructions. Mental health crisis number:  511 or your local mental health crisis line number at [phone]. Discharge Medication List and Instructions:   Current Discharge Medication List      START taking these medications    Details   lamoTRIgine (LAMICTAL) 25 mg tablet Take one tablet daily X 11 days, then one tablet BID X 7 days, then two tablets BID X 12 days. Indications: mood  Qty: 73 Tab, Refills: 0         CONTINUE these medications which have CHANGED    Details   DULoxetine (CYMBALTA) 60 mg capsule Take 1 Cap by mouth daily.  Indications: depression  Qty: 60 Cap, Refills: 0         CONTINUE these medications which have NOT CHANGED    Details   methylphenidate ER 36 mg 24 hr tab Take 36 mg by mouth every morning. Indications: Attention Deficit Disorder with Hyperactivity             Unresulted Labs (24h ago, onward)    None        To obtain results of studies pending at discharge, please contact [phone]    Follow-up Information     Follow up With Specialties Details Why [address] [place] [address]  [facility], [address]  [phone]          Advanced Directive:   Does the patient have an appointed surrogate decision maker? No  Does the patient have a Medical Advance Directive? No  Does the patient have a Psychiatric Advance Directive? No  If the patient does not have a surrogate or Medical Advance Directive AND Psychiatric Advance Directive, the patient was offered information on these advance directives Yes and Patient declined to complete    Patient Instructions: Please continue all medications until otherwise directed by physician. Tobacco Cessation Discharge Plan:   Is the patient a smoker and needs referral for smoking cessation? No  Patient referred to the following for smoking cessation with an appointment? Not applicable     Patient was offered medication to assist with smoking cessation at discharge? Not applicable  Was education for smoking cessation added to the discharge instructions? Yes    Alcohol/Substance Abuse Discharge Plan:   Does the patient have a history of substance/alcohol abuse and requires a referral for treatment? No  Patient referred to the following for substance/alcohol abuse treatment with an appointment? Not applicable  Patient was offered medication to assist with alcohol cessation at discharge? Not applicable  Was education for substance/alcohol abuse added to discharge instructions? No    Patient discharged to Home; discussed with patient/caregiver and provided to the patient/caregiver either in hard copy or electronically.